# Patient Record
Sex: FEMALE | Race: WHITE | URBAN - METROPOLITAN AREA
[De-identification: names, ages, dates, MRNs, and addresses within clinical notes are randomized per-mention and may not be internally consistent; named-entity substitution may affect disease eponyms.]

---

## 2020-08-31 ENCOUNTER — OFFICE VISIT (OUTPATIENT)
Dept: URBAN - METROPOLITAN AREA CLINIC 96 | Facility: CLINIC | Age: 45
End: 2020-08-31

## 2020-10-22 ENCOUNTER — OFFICE VISIT (OUTPATIENT)
Dept: URBAN - METROPOLITAN AREA CLINIC 50 | Facility: CLINIC | Age: 45
End: 2020-10-22
Payer: COMMERCIAL

## 2020-10-22 ENCOUNTER — WEB ENCOUNTER (OUTPATIENT)
Dept: URBAN - METROPOLITAN AREA CLINIC 50 | Facility: CLINIC | Age: 45
End: 2020-10-22

## 2020-10-22 VITALS
TEMPERATURE: 96.7 F | BODY MASS INDEX: 22.18 KG/M2 | WEIGHT: 138 LBS | HEIGHT: 66 IN | SYSTOLIC BLOOD PRESSURE: 140 MMHG | HEART RATE: 84 BPM | DIASTOLIC BLOOD PRESSURE: 85 MMHG

## 2020-10-22 DIAGNOSIS — R19.4 CHANGE IN BOWEL HABITS: ICD-10-CM

## 2020-10-22 DIAGNOSIS — R19.8 ALTERNATING CONSTIPATION AND DIARRHEA: ICD-10-CM

## 2020-10-22 DIAGNOSIS — R10.9 ABDOMINAL CRAMPING: ICD-10-CM

## 2020-10-22 PROCEDURE — 99213 OFFICE O/P EST LOW 20 MIN: CPT | Performed by: INTERNAL MEDICINE

## 2020-10-22 NOTE — HPI-TODAY'S VISIT:
45 y.o. WF Not seen in some time Cancelled appt end of August b/c thought syx would pass / go away / feeling a little better then In August, started not feeling well - foods affecting her quickly Since canelling appt, 100% worse Since last Saturday, however, feels different No longer things coming immediately out of her Energy is better No medication nor dietary change Not sure why so sick to now feeling like a human being Was having horrible pains  Pt mother wondered if had a chronic bug Bowel urgency w/ gut wrenching, sweating pain that improved w/ bowels until it happened again Was happening 4 out of 7 days Has hyoscyamine - will help when takes it Not having acid reflux Now, goes from constipation to diarrhea

## 2021-06-04 ENCOUNTER — APPOINTMENT (RX ONLY)
Dept: URBAN - METROPOLITAN AREA CLINIC 12 | Facility: CLINIC | Age: 46
Setting detail: DERMATOLOGY
End: 2021-06-04

## 2021-06-04 ENCOUNTER — TELEPHONE ENCOUNTER (OUTPATIENT)
Dept: URBAN - METROPOLITAN AREA CLINIC 98 | Facility: CLINIC | Age: 46
End: 2021-06-04

## 2021-06-04 DIAGNOSIS — Z41.1 ENCOUNTER FOR COSMETIC SURGERY: ICD-10-CM

## 2021-06-04 PROCEDURE — ? PATIENT SPECIFIC COUNSELING

## 2021-06-04 PROCEDURE — ? CONSULTATION - BREAST (COSMETIC)

## 2021-06-04 RX ORDER — HYOSCYAMINE SULFATE 0.12 MG/1
1 TABLET UNDER THE TONGUE AND ALLOW TO DISSOLVE TABLET SUBLINGUAL
Qty: 90 | Refills: 1 | OUTPATIENT

## 2021-06-04 ASSESSMENT — LOCATION SIMPLE DESCRIPTION DERM
LOCATION SIMPLE: RIGHT BREAST
LOCATION SIMPLE: LEFT BREAST

## 2021-06-04 ASSESSMENT — LOCATION DETAILED DESCRIPTION DERM
LOCATION DETAILED: LEFT MEDIAL BREAST 9-10:00 REGION
LOCATION DETAILED: RIGHT MEDIAL BREAST 3-4:00 REGION

## 2021-06-04 ASSESSMENT — LOCATION ZONE DERM: LOCATION ZONE: TRUNK

## 2021-06-04 NOTE — PROCEDURE: PATIENT SPECIFIC COUNSELING
Detail Level: Simple
Other (Free Text): Patient present today for breast augmentation and breast lift. Patient reports she has previously had an augmentation 25 years ago and a year later had reconstructive surgery due to complications from breast augmentation. Patient has still not changed out implants since. Patient also reports recent pain in breasts. Patient reports she has saline implants currently. Patient voiced she wants wants a cup size C and is currently a cup size DD. Patient also reports all recent mammograms have been clear with no concerns. Dr. Hammond examined patient and reviewed patient history. Dr. Hammond voiced grade 3 capsule on right and grade 3/4 capsule on the left.Dr. Hammond discussed surgical options with patient. Dr. Hammond voiced that if implants are saline, he can drain them, in the office to see what the desired cup size for the patient is. Dr. Hammond discussed incision sites and surgical options for patient, including breast lift with breast augmentation. Dr. Hammond voiced to patient that he would like her next mammogram to notify what type of implants the patient has and review mammogram results before making a decision about surgery and draining saline implants. Patient voiced agreement and understanding of risk, surgery, and healing factors.

## 2021-06-04 NOTE — PROCEDURE: CONSULTATION - BREAST (COSMETIC)
Detail Level: Detailed
Consultation Charge $ (Use Numbers Only, No Text Please.): 125
Send Procedure Quote As Charge: No

## 2022-05-31 ENCOUNTER — TELEPHONE ENCOUNTER (OUTPATIENT)
Dept: URBAN - METROPOLITAN AREA CLINIC 96 | Facility: CLINIC | Age: 47
End: 2022-05-31

## 2022-06-28 ENCOUNTER — OFFICE VISIT (OUTPATIENT)
Dept: URBAN - METROPOLITAN AREA CLINIC 96 | Facility: CLINIC | Age: 47
End: 2022-06-28
Payer: COMMERCIAL

## 2022-06-28 ENCOUNTER — LAB OUTSIDE AN ENCOUNTER (OUTPATIENT)
Dept: URBAN - METROPOLITAN AREA CLINIC 96 | Facility: CLINIC | Age: 47
End: 2022-06-28

## 2022-06-28 VITALS
BODY MASS INDEX: 24.11 KG/M2 | WEIGHT: 150 LBS | HEART RATE: 75 BPM | TEMPERATURE: 98.1 F | DIASTOLIC BLOOD PRESSURE: 80 MMHG | SYSTOLIC BLOOD PRESSURE: 119 MMHG | HEIGHT: 66 IN

## 2022-06-28 DIAGNOSIS — R13.10 DYSPHAGIA, UNSPECIFIED TYPE: ICD-10-CM

## 2022-06-28 DIAGNOSIS — K64.9 HEMORRHOIDS, UNSPECIFIED HEMORRHOID TYPE: ICD-10-CM

## 2022-06-28 DIAGNOSIS — L29.0 RECTAL ITCHING: ICD-10-CM

## 2022-06-28 DIAGNOSIS — K21.9 GASTROESOPHAGEAL REFLUX DISEASE, UNSPECIFIED WHETHER ESOPHAGITIS PRESENT: ICD-10-CM

## 2022-06-28 PROCEDURE — 99214 OFFICE O/P EST MOD 30 MIN: CPT

## 2022-06-28 RX ORDER — CLOTRIMAZOLE AND BETAMETHASONE DIPROPIONATE 10; .5 MG/G; MG/G
1 APPLICATION CREAM TOPICAL TWICE A DAY
Qty: 1 | Refills: 0 | OUTPATIENT
Start: 2022-06-28 | End: 2022-07-12

## 2022-06-28 NOTE — HPI-TODAY'S VISIT:
Patient is a 47-year-old female who presents for hemorrhoid pain/itching for about 3 months She was instructed to use over-the-counter Benefiber and do sitz bath.   She endorses she first had associated itching next to the rectum. This itching has worsened. She states she then developed hemorrhoids Has tried otc creams for the rectal itching and hemorrhoids. Has also tried preparation H suppositories with mild to no improvement Intermittent BRBPR on toilet paper with wiping  BMS are described as typically loose. She poses concern for food allergis as will have BMs immediately after eating certain things such as salad Lost 75 pounds over the past couploe years but has now gained 35. She feels this has exacerbated symptoms especially reflux symptoms Dairy, gluten, and soy free diet for the most part. Did eat gluten today and does not feel it is "sitting well"  Is now having return of GERD with associated nausea  Intermittent episodes of feeling like she has difficulty swallowing Denies vomiting Last colonoscopy was completed in 2018 for chronic diarrhea.  Internal hemorrhoids were noted otherwise colon appeared normal.  Diverticulosis in the sigmoid colon in the transverse colon and in the ascending colon.  Random colon biopsy showed no significant histopathology. Denies fever or chills

## 2022-06-29 PROBLEM — 40739000: Status: ACTIVE | Noted: 2022-06-28

## 2022-06-29 PROBLEM — 235595009: Status: ACTIVE | Noted: 2022-06-28

## 2022-06-30 ENCOUNTER — OFFICE VISIT (OUTPATIENT)
Dept: URBAN - METROPOLITAN AREA SURGERY CENTER 18 | Facility: SURGERY CENTER | Age: 47
End: 2022-06-30
Payer: COMMERCIAL

## 2022-06-30 ENCOUNTER — WEB ENCOUNTER (OUTPATIENT)
Dept: URBAN - METROPOLITAN AREA CLINIC 92 | Facility: CLINIC | Age: 47
End: 2022-06-30

## 2022-06-30 DIAGNOSIS — K22.89 DILATATION OF ESOPHAGUS: ICD-10-CM

## 2022-06-30 DIAGNOSIS — R11.0 AM NAUSEA: ICD-10-CM

## 2022-06-30 DIAGNOSIS — R13.19 CERVICAL DYSPHAGIA: ICD-10-CM

## 2022-06-30 PROCEDURE — 43239 EGD BIOPSY SINGLE/MULTIPLE: CPT | Performed by: INTERNAL MEDICINE

## 2022-06-30 PROCEDURE — G8907 PT DOC NO EVENTS ON DISCHARG: HCPCS | Performed by: INTERNAL MEDICINE

## 2022-06-30 RX ORDER — CLOTRIMAZOLE AND BETAMETHASONE DIPROPIONATE 10; .5 MG/G; MG/G
1 APPLICATION CREAM TOPICAL TWICE A DAY
Qty: 1 | Refills: 0 | Status: ACTIVE | COMMUNITY
Start: 2022-06-28 | End: 2022-07-12

## 2022-06-30 RX ORDER — PANTOPRAZOLE SODIUM 20 MG/1
1 TABLET TABLET, DELAYED RELEASE ORAL
Qty: 45 | Refills: 0 | OUTPATIENT

## 2022-07-07 ENCOUNTER — TELEPHONE ENCOUNTER (OUTPATIENT)
Dept: URBAN - METROPOLITAN AREA CLINIC 96 | Facility: CLINIC | Age: 47
End: 2022-07-07

## 2023-10-27 ENCOUNTER — OFFICE VISIT (OUTPATIENT)
Dept: URBAN - METROPOLITAN AREA CLINIC 96 | Facility: CLINIC | Age: 48
End: 2023-10-27

## 2023-10-31 ENCOUNTER — OFFICE VISIT (OUTPATIENT)
Dept: URBAN - METROPOLITAN AREA CLINIC 50 | Facility: CLINIC | Age: 48
End: 2023-10-31
Payer: COMMERCIAL

## 2023-10-31 ENCOUNTER — LAB OUTSIDE AN ENCOUNTER (OUTPATIENT)
Dept: URBAN - METROPOLITAN AREA CLINIC 50 | Facility: CLINIC | Age: 48
End: 2023-10-31

## 2023-10-31 VITALS
TEMPERATURE: 97.7 F | SYSTOLIC BLOOD PRESSURE: 153 MMHG | BODY MASS INDEX: 24.43 KG/M2 | WEIGHT: 152 LBS | DIASTOLIC BLOOD PRESSURE: 96 MMHG | HEART RATE: 73 BPM | HEIGHT: 66 IN

## 2023-10-31 DIAGNOSIS — K21.9 GASTROESOPHAGEAL REFLUX DISEASE, UNSPECIFIED WHETHER ESOPHAGITIS PRESENT: ICD-10-CM

## 2023-10-31 DIAGNOSIS — R10.11 RUQ ABDOMINAL PAIN: ICD-10-CM

## 2023-10-31 DIAGNOSIS — R19.7 DIARRHEA, UNSPECIFIED TYPE: ICD-10-CM

## 2023-10-31 DIAGNOSIS — R19.4 CHANGE IN BOWEL HABITS: ICD-10-CM

## 2023-10-31 DIAGNOSIS — R10.9 ABDOMINAL CRAMPING: ICD-10-CM

## 2023-10-31 DIAGNOSIS — R13.10 DYSPHAGIA, UNSPECIFIED TYPE: ICD-10-CM

## 2023-10-31 PROBLEM — 84229001: Status: ACTIVE | Noted: 2023-10-31

## 2023-10-31 PROBLEM — 62315008: Status: ACTIVE | Noted: 2023-10-31

## 2023-10-31 PROBLEM — 247330004: Status: ACTIVE | Noted: 2023-10-31

## 2023-10-31 PROBLEM — 301717006: Status: ACTIVE | Noted: 2023-10-31

## 2023-10-31 PROBLEM — 88111009: Status: ACTIVE | Noted: 2023-10-31

## 2023-10-31 PROCEDURE — 99214 OFFICE O/P EST MOD 30 MIN: CPT | Performed by: PHYSICIAN ASSISTANT

## 2023-10-31 RX ORDER — RIFAXIMIN 550 MG/1
1 TABLET TABLET ORAL THREE TIMES A DAY
Qty: 42 TABLET | Refills: 0 | OUTPATIENT
Start: 2023-10-31 | End: 2023-11-13

## 2023-10-31 RX ORDER — PANTOPRAZOLE SODIUM 20 MG/1
1 TABLET TABLET, DELAYED RELEASE ORAL DAILY
Qty: 30 TABLET | Refills: 1

## 2023-10-31 RX ORDER — PANTOPRAZOLE SODIUM 20 MG/1
1 TABLET TABLET, DELAYED RELEASE ORAL
Qty: 45 | Refills: 0 | Status: ON HOLD | COMMUNITY

## 2023-10-31 NOTE — HPI-TODAY'S VISIT:
Patient is 47 y/o F here to discuss bowel habit changes.  Notes stomach pain in past 6 months or so. States at that time had a UTI and was on extended antibiotics course aroudn time she started noticing this.  Progressively worsening, particularly in past motnh. Notes frequent alternating diarrhea and constipation. States sensitive now to salads, feels runs right through her in horus. Generally eating causing to feel poorly, abdominal pains quickly after eating, generalized. Admits frequent abdominal pain/cramping otherwise. Notes tried gluten/dairy/soy elimiations hasn't specifically helped. States noted mucus in stool a couple times mixed with stool, malodorous.  Admits frequent nausea, using levsin PRN which helps but continued to need this. States feels food stops custodial down chest when swallowing, no choking/gagging, no heartburn/reflux, but does feel throat can close-type sensation.  States used pantoprazole about 3 months around time of EGD last year but not routinely using any other reflux medicines. Denies abnormal weight loss. Denies hx anemia. Generally notes fatigue, malaise, no fever/chills.  Last colonoscopy completed 4/24/2018 with evidence of normal mucosa throughout the colon, diverticulosis in sigmoid transverse and ascending colon, internal hemorrhoids.  Normal pathology at that time of random colon biospy. Recommendation to repeat in 10 years.  Last EGD completed 6/30/2022 with evidence of LA grade a reflux esophagitis with no bleeding, normal duodenum, no gross lesions in stomach.  Pathology at that time revealing no significant histopathology in the duodenum, squamocolumnar junctional mucosa with chronic inflammation at GEJ.

## 2023-10-31 NOTE — PHYSICAL EXAM GASTROINTESTINAL
Abdomen , small reducible umbilical hernia noted, soft, generalized tenderness greatest in RUQ and epigastric area, no rebounding, nondistended , no guarding or rigidity , no masses palpable , normal bowel sounds , Liver and Spleen,  no hepatosplenomegaly , liver nontender

## 2023-11-15 ENCOUNTER — LAB OUTSIDE AN ENCOUNTER (OUTPATIENT)
Dept: URBAN - METROPOLITAN AREA CLINIC 50 | Facility: CLINIC | Age: 48
End: 2023-11-15

## 2023-11-16 LAB
A/G RATIO: 1.8
ABSOLUTE BASOPHILS: 42
ABSOLUTE EOSINOPHILS: 119
ABSOLUTE LYMPHOCYTES: 1848
ABSOLUTE MONOCYTES: 616
ABSOLUTE NEUTROPHILS: 4375
ALBUMIN: 4.7
ALKALINE PHOSPHATASE: 52
ALT (SGPT): 18
AST (SGOT): 18
BASOPHILS: 0.6
BILIRUBIN, TOTAL: 0.7
BUN/CREATININE RATIO: (no result)
BUN: 13
C-REACTIVE PROTEIN, QUANT: 1
CALCIUM: 9.1
CARBON DIOXIDE, TOTAL: 23
CHLORIDE: 103
CREATININE: 0.74
EGFR: 99
EOSINOPHILS: 1.7
GLOBULIN, TOTAL: 2.6
GLUCOSE: 86
HEMATOCRIT: 38.2
HEMOGLOBIN: 13.1
IMMUNOGLOBULIN A: 306
INTERPRETATION: (no result)
LYMPHOCYTES: 26.4
MCH: 32.8
MCHC: 34.3
MCV: 95.7
MONOCYTES: 8.8
MPV: 11.1
NEUTROPHILS: 62.5
PLATELET COUNT: 268
POTASSIUM: 4.1
PROTEIN, TOTAL: 7.3
RDW: 11.9
RED BLOOD CELL COUNT: 3.99
SODIUM: 139
TISSUE TRANSGLUTAMINASE AB, IGA: <1
WHITE BLOOD CELL COUNT: 7

## 2023-11-27 ENCOUNTER — ERX REFILL RESPONSE (OUTPATIENT)
Dept: URBAN - METROPOLITAN AREA CLINIC 50 | Facility: CLINIC | Age: 48
End: 2023-11-27

## 2023-11-27 RX ORDER — PANTOPRAZOLE SODIUM 20 MG/1
1 TABLET TABLET, DELAYED RELEASE ORAL DAILY
Qty: 30 TABLET | Refills: 1 | OUTPATIENT

## 2023-11-27 RX ORDER — PANTOPRAZOLE SODIUM 20 MG/1
TAKE 1 TABLET BY MOUTH EVERY DAY FOR 30 DAYS TABLET, DELAYED RELEASE ORAL
Qty: 90 TABLET | Refills: 0 | OUTPATIENT

## 2023-12-01 ENCOUNTER — TELEPHONE ENCOUNTER (OUTPATIENT)
Dept: URBAN - METROPOLITAN AREA CLINIC 50 | Facility: CLINIC | Age: 48
End: 2023-12-01

## 2023-12-05 ENCOUNTER — TELEPHONE ENCOUNTER (OUTPATIENT)
Dept: URBAN - METROPOLITAN AREA CLINIC 115 | Facility: CLINIC | Age: 48
End: 2023-12-05

## 2023-12-12 ENCOUNTER — OFFICE VISIT (OUTPATIENT)
Dept: URBAN - METROPOLITAN AREA CLINIC 50 | Facility: CLINIC | Age: 48
End: 2023-12-12
Payer: COMMERCIAL

## 2023-12-12 VITALS
TEMPERATURE: 97.6 F | DIASTOLIC BLOOD PRESSURE: 88 MMHG | BODY MASS INDEX: 25.49 KG/M2 | HEART RATE: 65 BPM | HEIGHT: 66 IN | WEIGHT: 158.6 LBS | SYSTOLIC BLOOD PRESSURE: 139 MMHG

## 2023-12-12 DIAGNOSIS — K58.0 IRRITABLE BOWEL SYNDROME WITH DIARRHEA: ICD-10-CM

## 2023-12-12 DIAGNOSIS — K21.9 GASTROESOPHAGEAL REFLUX DISEASE, UNSPECIFIED WHETHER ESOPHAGITIS PRESENT: ICD-10-CM

## 2023-12-12 DIAGNOSIS — R19.4 CHANGE IN BOWEL HABITS: ICD-10-CM

## 2023-12-12 DIAGNOSIS — R13.10 DYSPHAGIA, UNSPECIFIED TYPE: ICD-10-CM

## 2023-12-12 DIAGNOSIS — R10.84 ABDOMINAL CRAMPING, GENERALIZED: ICD-10-CM

## 2023-12-12 DIAGNOSIS — K64.9 HEMORRHOIDS, UNSPECIFIED HEMORRHOID TYPE: ICD-10-CM

## 2023-12-12 DIAGNOSIS — R10.9 ABDOMINAL CRAMPING: ICD-10-CM

## 2023-12-12 PROBLEM — 197125005: Status: ACTIVE | Noted: 2023-12-12

## 2023-12-12 PROCEDURE — 99214 OFFICE O/P EST MOD 30 MIN: CPT | Performed by: PHYSICIAN ASSISTANT

## 2023-12-12 RX ORDER — RIFAXIMIN 550 MG/1
1 TABLET TABLET ORAL THREE TIMES A DAY
Qty: 42 TABLET | Refills: 0
Start: 2023-10-31 | End: 2023-12-26

## 2023-12-12 RX ORDER — PANTOPRAZOLE SODIUM 20 MG/1
TAKE 1 TABLET BY MOUTH EVERY DAY FOR 30 DAYS TABLET, DELAYED RELEASE ORAL
Qty: 90 TABLET | Refills: 0 | Status: DISCONTINUED | COMMUNITY

## 2023-12-12 NOTE — HPI-TODAY'S VISIT:
12/12/23: Lab, ultrasound, stool studies completed after last visit - reviewed with patient Was not able to get xifaxin - was denied by insurance Restarted pantoprazole after last visit - notes sx of nausea, upper discomfort, swallow had improved with this Did try stopping but had to restart in past 3 days as sx again worsened without it Notes RUQ mostly now resolved since  Still some discomfort still though in lower abdomen prior to BM though which is fairly unchanged However states once emptying with bowel movement pain pretty much resolves Does note certain food triggers discussed last visit worsen this Bowel movements still loose Did have some rectal itching, irritation, blood with wiping in interval also but not presently -- believes hemorrhiods are irritated -- 10/31/23: Patient is 47 y/o F here to discuss bowel habit changes.  Notes stomach pain in past 6 months or so. States at that time had a UTI and was on extended antibiotics course aroudn time she started noticing this.  Progressively worsening, particularly in past motnh. Notes frequent alternating diarrhea and constipation. States sensitive now to salads, feels runs right through her in horus. Generally eating causing to feel poorly, abdominal pains quickly after eating, generalized. Admits frequent abdominal pain/cramping otherwise. Notes tried gluten/dairy/soy elimiations hasn't specifically helped. States noted mucus in stool a couple times mixed with stool, malodorous.  Admits frequent nausea, using levsin PRN which helps but continued to need this. States feels food stops long-term down chest when swallowing, no choking/gagging, no heartburn/reflux, but does feel throat can close-type sensation.  States used pantoprazole about 3 months around time of EGD last year but not routinely using any other reflux medicines. Denies abnormal weight loss. Denies hx anemia. Generally notes fatigue, malaise, no fever/chills.  Last colonoscopy completed 4/24/2018 with evidence of normal mucosa throughout the colon, diverticulosis in sigmoid transverse and ascending colon, internal hemorrhoids.  Normal pathology at that time of random colon biospy. Recommendation to repeat in 10 years.  Last EGD completed 6/30/2022 with evidence of LA grade a reflux esophagitis with no bleeding, normal duodenum, no gross lesions in stomach.  Pathology at that time revealing no significant histopathology in the duodenum, squamocolumnar junctional mucosa with chronic inflammation at GEJ.

## 2023-12-13 ENCOUNTER — DASHBOARD ENCOUNTERS (OUTPATIENT)
Age: 48
End: 2023-12-13

## 2023-12-13 PROBLEM — 70153002: Status: ACTIVE | Noted: 2023-12-13

## 2023-12-22 ENCOUNTER — TELEPHONE ENCOUNTER (OUTPATIENT)
Dept: URBAN - METROPOLITAN AREA CLINIC 96 | Facility: CLINIC | Age: 48
End: 2023-12-22

## 2024-12-10 ENCOUNTER — LAB OUTSIDE AN ENCOUNTER (OUTPATIENT)
Dept: URBAN - METROPOLITAN AREA CLINIC 50 | Facility: CLINIC | Age: 49
End: 2024-12-10

## 2024-12-10 ENCOUNTER — OFFICE VISIT (OUTPATIENT)
Dept: URBAN - METROPOLITAN AREA CLINIC 50 | Facility: CLINIC | Age: 49
End: 2024-12-10
Payer: COMMERCIAL

## 2024-12-10 ENCOUNTER — OFFICE VISIT (OUTPATIENT)
Dept: URBAN - METROPOLITAN AREA CLINIC 50 | Facility: CLINIC | Age: 49
End: 2024-12-10

## 2024-12-10 VITALS
TEMPERATURE: 98.1 F | WEIGHT: 160 LBS | DIASTOLIC BLOOD PRESSURE: 72 MMHG | HEIGHT: 66 IN | HEART RATE: 67 BPM | RESPIRATION RATE: 18 BRPM | BODY MASS INDEX: 25.71 KG/M2 | SYSTOLIC BLOOD PRESSURE: 103 MMHG

## 2024-12-10 DIAGNOSIS — K57.30 DIVERTICULOSIS OF COLON: ICD-10-CM

## 2024-12-10 DIAGNOSIS — K21.9 GASTROESOPHAGEAL REFLUX DISEASE, UNSPECIFIED WHETHER ESOPHAGITIS PRESENT: ICD-10-CM

## 2024-12-10 DIAGNOSIS — K62.5 BRBPR (BRIGHT RED BLOOD PER RECTUM): ICD-10-CM

## 2024-12-10 DIAGNOSIS — R10.9 ABDOMINAL CRAMPING: ICD-10-CM

## 2024-12-10 DIAGNOSIS — K64.9 HEMORRHOIDS, UNSPECIFIED HEMORRHOID TYPE: ICD-10-CM

## 2024-12-10 DIAGNOSIS — K58.0 IRRITABLE BOWEL SYNDROME WITH DIARRHEA: ICD-10-CM

## 2024-12-10 DIAGNOSIS — R10.32 LLQ PAIN: ICD-10-CM

## 2024-12-10 DIAGNOSIS — R19.4 CHANGE IN BOWEL HABIT: ICD-10-CM

## 2024-12-10 PROBLEM — 397881000: Status: ACTIVE | Noted: 2024-12-10

## 2024-12-10 PROCEDURE — 99214 OFFICE O/P EST MOD 30 MIN: CPT | Performed by: PHYSICIAN ASSISTANT

## 2024-12-10 RX ORDER — PANTOPRAZOLE SODIUM 20 MG/1
TAKE 1 TABLET BY MOUTH EVERY DAY TABLET, DELAYED RELEASE ORAL
Qty: 90 TABLET | Refills: 0 | Status: ACTIVE | COMMUNITY

## 2024-12-10 NOTE — PHYSICAL EXAM GASTROINTESTINAL
Abdomen , soft, generalized to LLQ tender, nondistended , no guarding or rigidity , no masses palpable , normal bowel sounds , Liver and Spleen,  no hepatosplenomegaly , liver nontender

## 2024-12-10 NOTE — HPI-TODAY'S VISIT:
12/10/24: 48 y/o F here for f/u stomach pains States generally has been doing good since time of last winter until past 2 months Now, incr occ discomfort, cramping, looseness on and off gradually last couple months Last week, syx worsened - was in a meeting and felt generally really bad, flu-like, achiness, "weird shit" Saw urgent care, was worried may had covid so saw urgent care and tested for flu/covid/strep, all was negative That night, terrible abdominal discomfort/pains, worse in LUQ, diarrhea/looseness, nausea, gassiness/distention, generally felt sick Restarted protonix given nausea/discomfort, started feeling better a bit Sunday, but still sensitive/felt bad w food/breads, not quite good Has had a couple episodes since of intermittent cramping, since one episode was really bad and almost went to ER Since, no bowel movement, but also not much eating, low fiber stuff mostly/broth Does admit recent use celebrex for back problems Admits chronic BRBPR, most days w wiping, unchanged lately BMs 1x/1-2 days typically Unrelated but saw that we complete banding w exam room educational screens and interested in this Gets lots of irritation and daily bleeding form hemorrhoids in past year Uses topicals daily but still lots of syx from this all the time No heart/lung/kidney disease, can do stairs, not on blood thinners -- 12/12/23: Lab, ultrasound, stool studies completed after last visit - reviewed with patient Was not able to get xifaxin - was denied by insurance Restarted pantoprazole after last visit - notes sx of nausea, upper discomfort, swallow had improved with this Did try stopping but had to restart in past 3 days as sx again worsened without it Notes RUQ mostly now resolved since  Still some discomfort still though in lower abdomen prior to BM though which is fairly unchanged However states once emptying with bowel movement pain pretty much resolves Does note certain food triggers discussed last visit worsen this Bowel movements still loose Did have some rectal itching, irritation, blood with wiping in interval also but not presently -- believes hemorrhiods are irritated -- 10/31/23: Patient is 47 y/o F here to discuss bowel habit changes.  Notes stomach pain in past 6 months or so. States at that time had a UTI and was on extended antibiotics course aroudn time she started noticing this.  Progressively worsening, particularly in past motnh. Notes frequent alternating diarrhea and constipation. States sensitive now to salads, feels runs right through her in horus. Generally eating causing to feel poorly, abdominal pains quickly after eating, generalized. Admits frequent abdominal pain/cramping otherwise. Notes tried gluten/dairy/soy elimiations hasn't specifically helped. States noted mucus in stool a couple times mixed with stool, malodorous.  Admits frequent nausea, using levsin PRN which helps but continued to need this. States feels food stops assisted down chest when swallowing, no choking/gagging, no heartburn/reflux, but does feel throat can close-type sensation.  States used pantoprazole about 3 months around time of EGD last year but not routinely using any other reflux medicines. Denies abnormal weight loss. Denies hx anemia. Generally notes fatigue, malaise, no fever/chills.  Last colonoscopy completed 4/24/2018 with evidence of normal mucosa throughout the colon, diverticulosis in sigmoid transverse and ascending colon, internal hemorrhoids.  Normal pathology at that time of random colon biospy. Recommendation to repeat in 10 years.  Last EGD completed 6/30/2022 with evidence of LA grade a reflux esophagitis with no bleeding, normal duodenum, no gross lesions in stomach.  Pathology at that time revealing no significant histopathology in the duodenum, squamocolumnar junctional mucosa with chronic inflammation at GEJ.

## 2024-12-11 ENCOUNTER — TELEPHONE ENCOUNTER (OUTPATIENT)
Dept: URBAN - METROPOLITAN AREA CLINIC 50 | Facility: CLINIC | Age: 49
End: 2024-12-11

## 2025-02-18 ENCOUNTER — OFFICE VISIT (OUTPATIENT)
Dept: URBAN - METROPOLITAN AREA SURGERY CENTER 18 | Facility: SURGERY CENTER | Age: 50
End: 2025-02-18

## 2025-04-23 ENCOUNTER — OFFICE VISIT (OUTPATIENT)
Dept: URBAN - METROPOLITAN AREA SURGERY CENTER 18 | Facility: SURGERY CENTER | Age: 50
End: 2025-04-23

## 2025-04-29 ENCOUNTER — OFFICE VISIT (OUTPATIENT)
Dept: URBAN - METROPOLITAN AREA CLINIC 50 | Facility: CLINIC | Age: 50
End: 2025-04-29

## 2025-04-29 RX ORDER — KETOCONAZOLE 20 MG/G
1 APPLICATION CREAM TOPICAL ONCE A DAY
Qty: 30 GRAM | Refills: 0 | OUTPATIENT
Start: 2025-04-29 | End: 2025-05-13

## 2025-04-29 RX ORDER — PANTOPRAZOLE SODIUM 20 MG/1
1 TABLET 1/2 TO 1 HOUR BEFORE MORNING MEAL TABLET, DELAYED RELEASE ORAL ONCE A DAY
Qty: 90 TABLET | Refills: 3

## 2025-04-29 RX ORDER — PANTOPRAZOLE SODIUM 20 MG/1
TAKE 1 TABLET BY MOUTH EVERY DAY TABLET, DELAYED RELEASE ORAL
Qty: 90 TABLET | Refills: 0 | Status: ACTIVE | COMMUNITY

## 2025-04-29 NOTE — HPI-TODAY'S VISIT:
4/29/25: 50 y/o F here discussion rectal issues States continues to chornically bother, but driving crazy last few days One spot still problematic, really itching bad - wants to take a look Has always deferred rectal exams in office but so bothersome needs to be checked Itching all throughout buttocks, feels similar to prev skin yeast infection Sometimes still brbpr w bowels, but much less often than prior actually Biggest issue itching No nausea/vomiting, no abd pains BMs 2-3x/day, incr frequency, back and forth w loose and constipation, really gets weird if stopping pantoprazole Appetite good, no abnoraml wt loss Last labs w Dr. Wick last 3 or so months - normal per patient report Also seeing derm - they also droeen a bunch labs Abd cramping/discomfort better last few months - using pantopraozle routinely -- 12/10/24: 50 y/o F here for f/u stomach pains States generally has been doing good since time of last winter until past 2 months Now, incr occ discomfort, cramping, looseness on and off gradually last couple months Last week, syx worsened - was in a meeting and felt generally really bad, flu-like, achiness, "weird shit" Saw urgent care, was worried may had covid so saw urgent care and tested for flu/covid/strep, all was negative That night, terrible abdominal discomfort/pains, worse in LUQ, diarrhea/looseness, nausea, gassiness/distention, generally felt sick Restarted protonix given nausea/discomfort, started feeling better a bit Sunday, but still sensitive/felt bad w food/breads, not quite good Has had a couple episodes since of intermittent cramping, since one episode was really bad and almost went to ER Since, no bowel movement, but also not much eating, low fiber stuff mostly/broth Does admit recent use celebrex for back problems Admits chronic BRBPR, most days w wiping, unchanged lately BMs 1x/1-2 days typically Unrelated but saw that we complete banding w exam room educational screens and interested in this Gets lots of irritation and daily bleeding form hemorrhoids in past year Uses topicals daily but still lots of syx from this all the time No heart/lung/kidney disease, can do stairs, not on blood thinners

## 2025-04-29 NOTE — PHYSICAL EXAM GASTROINTESTINAL
Abdomen , soft, nontender, nondistended , no guarding or rigidity , no masses palpable , normal bowel sounds , Liver and Spleen,  no hepatosplenomegaly , liver nontender, Rectal, normal sphincter tone, no rectal masses or bleeding present intenral hemorrhoid appreciated w leonides beefy red intertriginous rash noted on gluteal cleft w 1 area furrowing/cracking in skin; no anal involvement noted

## 2025-05-05 ENCOUNTER — OFFICE VISIT (OUTPATIENT)
Dept: URBAN - METROPOLITAN AREA SURGERY CENTER 18 | Facility: SURGERY CENTER | Age: 50
End: 2025-05-05

## 2025-05-12 ENCOUNTER — OFFICE VISIT (OUTPATIENT)
Dept: URBAN - METROPOLITAN AREA SURGERY CENTER 18 | Facility: SURGERY CENTER | Age: 50
End: 2025-05-12
Payer: COMMERCIAL

## 2025-05-12 ENCOUNTER — WEB ENCOUNTER (OUTPATIENT)
Dept: URBAN - METROPOLITAN AREA CLINIC 96 | Facility: CLINIC | Age: 50
End: 2025-05-12

## 2025-05-12 DIAGNOSIS — R10.84 GENERALIZED ABDOMINAL PAIN: ICD-10-CM

## 2025-05-12 DIAGNOSIS — K62.5 ANAL BLEEDING: ICD-10-CM

## 2025-05-12 DIAGNOSIS — K63.89 OTHER SPECIFIED DISEASES OF INTESTINE: ICD-10-CM

## 2025-05-12 DIAGNOSIS — K21.9 GERD: ICD-10-CM

## 2025-05-12 PROCEDURE — 45378 DIAGNOSTIC COLONOSCOPY: CPT | Performed by: INTERNAL MEDICINE

## 2025-05-12 PROCEDURE — 00811 ANES LWR INTST NDSC NOS: CPT | Performed by: NURSE ANESTHETIST, CERTIFIED REGISTERED

## 2025-05-12 RX ORDER — KETOCONAZOLE 20 MG/G
1 APPLICATION CREAM TOPICAL ONCE A DAY
Qty: 30 GRAM | Refills: 0 | Status: ACTIVE | COMMUNITY
Start: 2025-04-29 | End: 2025-05-13

## 2025-05-12 RX ORDER — PANTOPRAZOLE SODIUM 20 MG/1
1 TABLET 1/2 TO 1 HOUR BEFORE MORNING MEAL TABLET, DELAYED RELEASE ORAL ONCE A DAY
Qty: 90 TABLET | Refills: 3 | Status: ACTIVE | COMMUNITY

## 2025-06-10 ENCOUNTER — OFFICE VISIT (OUTPATIENT)
Dept: URBAN - METROPOLITAN AREA CLINIC 50 | Facility: CLINIC | Age: 50
End: 2025-06-10
Payer: COMMERCIAL

## 2025-06-10 DIAGNOSIS — K64.8 INTERNAL HEMORRHOID: ICD-10-CM

## 2025-06-10 DIAGNOSIS — L29.0 ANAL PRURITUS: ICD-10-CM

## 2025-06-10 DIAGNOSIS — L30.4 PRURITIC INTERTRIGO: ICD-10-CM

## 2025-06-10 DIAGNOSIS — K21.9 GASTROESOPHAGEAL REFLUX DISEASE, UNSPECIFIED WHETHER ESOPHAGITIS PRESENT: ICD-10-CM

## 2025-06-10 DIAGNOSIS — K57.90 DIVERTICULOSIS: ICD-10-CM

## 2025-06-10 PROCEDURE — 99213 OFFICE O/P EST LOW 20 MIN: CPT | Performed by: PHYSICIAN ASSISTANT

## 2025-06-10 RX ORDER — CLOTRIMAZOLE 10 MG/G
1 APPLICATION CREAM TOPICAL TWICE A DAY
Qty: 30 | Refills: 0 | OUTPATIENT
Start: 2025-06-10 | End: 2025-07-08

## 2025-06-10 RX ORDER — PANTOPRAZOLE SODIUM 20 MG/1
1 TABLET 1/2 TO 1 HOUR BEFORE MORNING MEAL TABLET, DELAYED RELEASE ORAL ONCE A DAY
Qty: 90 TABLET | Refills: 3 | Status: ON HOLD | COMMUNITY

## 2025-06-10 NOTE — HPI-TODAY'S VISIT:
6/10/25: 49 y/o F here f/u hemorrhoid Doing fiber now - seems to help produce more predictable bowel movement BMs 1x/day, no mucus, still slight occ brbpr, burning discomfort Still irritation/itching in backside - feels skin rash came back again Not sure if backside still irritation Did protonix on and off for discomfort/cramping - thinking about restarting - responds well to this -- 4/29/25: 48 y/o F here discussion rectal issues States continues to chornically bother, but driving crazy last few days One spot still problematic, really itching bad - wants to take a look Has always deferred rectal exams in office but so bothersome needs to be checked Itching all throughout buttocks, feels similar to prev skin yeast infection Sometimes still brbpr w bowels, but much less often than prior actually Biggest issue itching No nausea/vomiting, no abd pains BMs 2-3x/day, incr frequency, back and forth w loose and constipation, really gets weird if stopping pantoprazole Appetite good, no abnoraml wt loss Last labs w Dr. Wick last 3 or so months - normal per patient report Also seeing derm - they also doreen a bunch labs Abd cramping/discomfort better last few months - using pantopraozle routinely -- 12/10/24: 48 y/o F here for f/u stomach pains States generally has been doing good since time of last winter until past 2 months Now, incr occ discomfort, cramping, looseness on and off gradually last couple months Last week, syx worsened - was in a meeting and felt generally really bad, flu-like, achiness, "weird shit" Saw urgent care, was worried may had covid so saw urgent care and tested for flu/covid/strep, all was negative That night, terrible abdominal discomfort/pains, worse in LUQ, diarrhea/looseness, nausea, gassiness/distention, generally felt sick Restarted protonix given nausea/discomfort, started feeling better a bit Sunday, but still sensitive/felt bad w food/breads, not quite good Has had a couple episodes since of intermittent cramping, since one episode was really bad and almost went to ER Since, no bowel movement, but also not much eating, low fiber stuff mostly/broth Does admit recent use celebrex for back problems Admits chronic BRBPR, most days w wiping, unchanged lately BMs 1x/1-2 days typically Unrelated but saw that we complete banding w exam room educational screens and interested in this Gets lots of irritation and daily bleeding form hemorrhoids in past year Uses topicals daily but still lots of syx from this all the time No heart/lung/kidney disease, can do stairs, not on blood thinners

## 2025-06-10 NOTE — PHYSICAL EXAM GASTROINTESTINAL
Abdomen , soft, nontender, nondistended , no guarding or rigidity , no masses palpable , normal bowel sounds , Liver and Spleen,  no hepatosplenomegaly , liver nontender, Rectal, normal sphincter tone, no rectal masses or bleeding present BRIANDA deferred gluteal cleft intertriginous rash; no anal involvement noted

## 2025-06-12 ENCOUNTER — OFFICE VISIT (OUTPATIENT)
Dept: URBAN - METROPOLITAN AREA CLINIC 50 | Facility: CLINIC | Age: 50
End: 2025-06-12

## 2025-07-24 ENCOUNTER — OFFICE VISIT (OUTPATIENT)
Dept: URBAN - METROPOLITAN AREA CLINIC 50 | Facility: CLINIC | Age: 50
End: 2025-07-24
Payer: COMMERCIAL

## 2025-07-24 DIAGNOSIS — L29.0 ANAL PRURITUS: ICD-10-CM

## 2025-07-24 DIAGNOSIS — K21.9 GASTROESOPHAGEAL REFLUX DISEASE, UNSPECIFIED WHETHER ESOPHAGITIS PRESENT: ICD-10-CM

## 2025-07-24 DIAGNOSIS — K59.01 SLOW TRANSIT CONSTIPATION: ICD-10-CM

## 2025-07-24 DIAGNOSIS — L30.4 PRURITIC INTERTRIGO: ICD-10-CM

## 2025-07-24 DIAGNOSIS — K57.90 DIVERTICULOSIS: ICD-10-CM

## 2025-07-24 DIAGNOSIS — R19.8 IRREGULAR BOWEL HABITS: ICD-10-CM

## 2025-07-24 DIAGNOSIS — R19.5 ABNORMAL STOOLS: ICD-10-CM

## 2025-07-24 DIAGNOSIS — K64.8 INTERNAL HEMORRHOID: ICD-10-CM

## 2025-07-24 DIAGNOSIS — R21 SKIN RASH: ICD-10-CM

## 2025-07-24 PROCEDURE — 99213 OFFICE O/P EST LOW 20 MIN: CPT | Performed by: PHYSICIAN ASSISTANT

## 2025-07-24 RX ORDER — PANTOPRAZOLE SODIUM 20 MG/1
1 TABLET 1/2 TO 1 HOUR BEFORE MORNING MEAL TABLET, DELAYED RELEASE ORAL ONCE A DAY
Qty: 90 TABLET | Refills: 3 | Status: ON HOLD | COMMUNITY

## 2025-07-24 NOTE — HPI-TODAY'S VISIT:
7/24/25: 51 y/o F f/u hemorrhoid Was going to do banding today - cost issue - rescheduled w me as wanted to discuss In interval, had physical, discussed w PCP a stringy/red stool Worried may had tapeworm or something - pcp provided a stool kit for O&P that hasn't completed yet Some constipation issue still on and off - skipping days, balls coming out, then followed by diarrhea Now again minimal production of stools Seems cyclical back and forth between constipation and diarrhea Still w rash on backside - never responded to azole creams- saw dermatology as persisted  Now trying rinvoq to help Appetite good, no nausea/vomiting Remains interested in banding but awaiting this until financially feasible -- 6/10/25: 51 y/o F here f/u hemorrhoid Doing fiber now - seems to help produce more predictable bowel movement BMs 1x/day, no mucus, still slight occ brbpr, burning discomfort Still irritation/itching in backside - feels skin rash came back again Not sure if backside still irritation Did protonix on and off for discomfort/cramping - thinking about restarting - responds well to this -- 4/29/25: 50 y/o F here discussion rectal issues States continues to chornically bother, but driving crazy last few days One spot still problematic, really itching bad - wants to take a look Has always deferred rectal exams in office but so bothersome needs to be checked Itching all throughout buttocks, feels similar to prev skin yeast infection Sometimes still brbpr w bowels, but much less often than prior actually Biggest issue itching No nausea/vomiting, no abd pains BMs 2-3x/day, incr frequency, back and forth w loose and constipation, really gets weird if stopping pantoprazole Appetite good, no abnoraml wt loss Last labs w Dr. Wick last 3 or so months - normal per patient report Also seeing derm - they also doreen a bunch labs Abd cramping/discomfort better last few months - using pantopraozle routinely -- 12/10/24: 50 y/o F here for f/u stomach pains States generally has been doing good since time of last winter until past 2 months Now, incr occ discomfort, cramping, looseness on and off gradually last couple months Last week, syx worsened - was in a meeting and felt generally really bad, flu-like, achiness, "weird shit" Saw urgent care, was worried may had covid so saw urgent care and tested for flu/covid/strep, all was negative That night, terrible abdominal discomfort/pains, worse in LUQ, diarrhea/looseness, nausea, gassiness/distention, generally felt sick Restarted protonix given nausea/discomfort, started feeling better a bit Sunday, but still sensitive/felt bad w food/breads, not quite good Has had a couple episodes since of intermittent cramping, since one episode was really bad and almost went to ER Since, no bowel movement, but also not much eating, low fiber stuff mostly/broth Does admit recent use celebrex for back problems Admits chronic BRBPR, most days w wiping, unchanged lately BMs 1x/1-2 days typically Unrelated but saw that we complete banding w exam room educational screens and interested in this Gets lots of irritation and daily bleeding form hemorrhoids in past year Uses topicals daily but still lots of syx from this all the time No heart/lung/kidney disease, can do stairs, not on blood thinners

## 2025-07-25 ENCOUNTER — LAB OUTSIDE AN ENCOUNTER (OUTPATIENT)
Dept: URBAN - METROPOLITAN AREA CLINIC 50 | Facility: CLINIC | Age: 50
End: 2025-07-25

## 2025-07-29 LAB
ADENOVIRUS F 40/41: NOT DETECTED
CALPROTECTIN, STOOL - QDX: (no result)
CAMPYLOBACTER: NOT DETECTED
CLOSTRIDIUM DIFFICILE: NOT DETECTED
ENTAMOEBA HISTOLYTICA: NOT DETECTED
ENTEROAGGREGATIVE E.COLI: NOT DETECTED
ENTEROTOXIGENIC E.COLI: NOT DETECTED
ESCHERICHIA COLI O157: NOT DETECTED
GIARDIA LAMBLIA: NOT DETECTED
NOROVIRUS GI/GII: NOT DETECTED
OVA AND PARASITE - QDX: (no result)
ROTAVIRUS A: NOT DETECTED
SALMONELLA SPP.: NOT DETECTED
SHIGA-LIKE TOXIN PRODUCING E.COLI: NOT DETECTED
SHIGELLA SPP. / ENTEROINVASIVE E.COLI: NOT DETECTED
VIBRIO PARAHAEMOLYTICUS: NOT DETECTED
VIBRIO SPP.: NOT DETECTED
YERSINIA ENTEROCOLITICA: NOT DETECTED

## 2025-08-01 ENCOUNTER — TELEPHONE ENCOUNTER (OUTPATIENT)
Dept: URBAN - METROPOLITAN AREA CLINIC 50 | Facility: CLINIC | Age: 50
End: 2025-08-01